# Patient Record
Sex: FEMALE | Race: OTHER | URBAN - METROPOLITAN AREA
[De-identification: names, ages, dates, MRNs, and addresses within clinical notes are randomized per-mention and may not be internally consistent; named-entity substitution may affect disease eponyms.]

---

## 2019-01-22 ENCOUNTER — EMERGENCY (EMERGENCY)
Facility: HOSPITAL | Age: 24
LOS: 0 days | Discharge: HOME | End: 2019-01-22
Attending: EMERGENCY MEDICINE
Payer: MEDICAID

## 2019-01-22 VITALS — HEART RATE: 80 BPM

## 2019-01-22 VITALS
RESPIRATION RATE: 18 BRPM | SYSTOLIC BLOOD PRESSURE: 137 MMHG | HEART RATE: 89 BPM | OXYGEN SATURATION: 99 % | DIASTOLIC BLOOD PRESSURE: 96 MMHG | HEIGHT: 61 IN | WEIGHT: 179.9 LBS | TEMPERATURE: 98 F
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
## 2019-01-22 DIAGNOSIS — S20.20XA CONTUSION OF THORAX, UNSPECIFIED, INITIAL ENCOUNTER: ICD-10-CM
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
## 2019-01-22 PROCEDURE — 71101 X-RAY EXAM UNILAT RIBS/CHEST: CPT | Mod: 26,RT

## 2019-01-22 PROCEDURE — 99284 EMERGENCY DEPT VISIT MOD MDM: CPT

## 2019-01-22 RX ORDER — IBUPROFEN 200 MG
600 TABLET ORAL ONCE
Qty: 0 | Refills: 0 | Status: COMPLETED | OUTPATIENT
Start: 2019-01-22 | End: 2019-01-22

## 2019-01-22 RX ORDER — ACETAMINOPHEN 500 MG
2 TABLET ORAL
Qty: 40 | Refills: 0 | OUTPATIENT
Start: 2019-01-22 | End: 2019-01-26

## 2019-01-22 RX ORDER — IBUPROFEN 200 MG
1 TABLET ORAL
Qty: 20 | Refills: 0 | OUTPATIENT
Start: 2019-01-22 | End: 2019-01-26

## 2019-01-22 RX ORDER — ACETAMINOPHEN 500 MG
975 TABLET ORAL ONCE
Qty: 0 | Refills: 0 | Status: DISCONTINUED | OUTPATIENT
Start: 2019-01-22 | End: 2019-01-22

## 2019-01-22 RX ORDER — OXYCODONE AND ACETAMINOPHEN 5; 325 MG/1; MG/1
1 TABLET ORAL ONCE
Qty: 0 | Refills: 0 | Status: DISCONTINUED | OUTPATIENT
Start: 2019-01-22 | End: 2019-01-22

## 2019-01-22 RX ADMIN — Medication 600 MILLIGRAM(S): at 10:33

## 2019-01-22 RX ADMIN — Medication 600 MILLIGRAM(S): at 11:00

## 2019-01-22 RX ADMIN — OXYCODONE AND ACETAMINOPHEN 1 TABLET(S): 5; 325 TABLET ORAL at 11:00

## 2019-01-22 RX ADMIN — OXYCODONE AND ACETAMINOPHEN 1 TABLET(S): 5; 325 TABLET ORAL at 10:33

## 2019-01-22 NOTE — ED ADULT NURSE NOTE - NSIMPLEMENTINTERV_GEN_ALL_ED
Implemented All Fall with Harm Risk Interventions:  [place] to call system. Call bell, personal items and telephone within reach. Instruct patient to call for assistance. Room bathroom lighting operational. Non-slip footwear when patient is off stretcher. Physically safe environment: no spills, clutter or unnecessary equipment. Stretcher in lowest position, wheels locked, appropriate side rails in place. Provide visual cue, wrist band, yellow gown, etc. Monitor gait and stability. Monitor for mental status changes and reorient to person, place, and time. Review medications for side effects contributing to fall risk. Reinforce activity limits and safety measures with patient and family. Provide visual clues: red socks.

## 2019-01-22 NOTE — ED PROVIDER NOTE - MEDICAL DECISION MAKING DETAILS
23 yo F with R rib contusion; patient states she is sure she is not pregnant, she understands the risk of possible pregnancy after fall, as well as possible contraindication to pain meds when pregnant--pt. verbalizes understanding   -pain control, ibuprofen/percocet, xray ribs on R, r/o fracture  -f/u results, reeval

## 2019-01-22 NOTE — ED ADULT NURSE NOTE - OBJECTIVE STATEMENT
Pt states she fell a half of flight stairs at home, missed a step and hit head, face, right ribs, and back with SOB and nausea. Pt c/o of back and right side rib pain. Pt denies LOC and HA, vomiting. Pt states she fell down 10 staircases at home this morning, missed a step and hit head, face, right ribs, and back with SOB and nausea. Pt c/o of lower back and right side rib pain. Pt presences with right side facial bruising, redness and swelling. Pt denies LOC, HA, and vomiting.

## 2019-01-22 NOTE — ED PROVIDER NOTE - OBJECTIVE STATEMENT
25 yo F s/p accidental fall down about 7-10 steps.  Pt. says she was walking down, tripped and face-planted.  She currently denies LOC.  She remembers falling down.  Her R posterior rib pain is severe, hurts to take a deep breath.  She his her R cheek, but denies change in vision.  She feels well otherwise.   ROS: negative for fever, cough, headache, shortness of breath, abd pain, nausea, vomiting, diarrhea, rash, paresthesia, and weakness--all other systems reviewed are negative.   PMH: negative; Meds: Denies; SH: Denies smoking/drinking/drug use

## 2019-01-22 NOTE — ED PROVIDER NOTE - PHYSICAL EXAMINATION
Vitals: WNL  Gen: AAOx3, NAD, laying uncomfortably in stretcher, tearful   Head: ncat, perrla, eomi b/l  Neck: supple, no lymphadenopathy, no midline deviation  Heart: rrr, no m/r/g  Lungs: CTA b/l, no rales/ronchi/wheezes, tender to palpation over posterior R lateral ribs, no skin changes or gross deformity, no skin break or bruising  Abd: soft, nontender, non-distended, no rebound or guarding  Ext: no clubbing/cyanosis/edema, moving all 4 ext without issue, no gross deformities   Neuro: sensation and muscle strength intact b/l, steady gait   back: no midline tenderness, no superficial evidence of trauma, no stepoff Vitals: WNL  Gen: AAOx3, NAD, laying uncomfortably in stretcher, tearful   Head: + bruising to R maxilla, no crepitus or skin break, no periorbital edema or hematoma, negative finch's/racoons, otherwise ncat, perrla, eomi b/l, no diplopia, no entrapment of EOMs  Neck: supple, no lymphadenopathy, no midline deviation  Heart: rrr, no m/r/g  Lungs: CTA b/l, no rales/ronchi/wheezes, tender to palpation over posterior R lateral ribs, no skin changes or gross deformity, no skin break or bruising  Abd: soft, nontender, non-distended, no rebound or guarding  Ext: no clubbing/cyanosis/edema, moving all 4 ext without issue, no gross deformities   Neuro: sensation and muscle strength intact b/l, steady gait   back: no midline tenderness, no superficial evidence of trauma, no stepoff

## 2019-01-22 NOTE — ED ADULT TRIAGE NOTE - CHIEF COMPLAINT QUOTE
pt states " I fell  down about 10 stairs this morning and hit my head." pt complaining of severe lower back pain. unsure if she passed out.

## 2019-01-22 NOTE — ED PROVIDER NOTE - PROGRESS NOTE DETAILS
Results reported to patient--grossly benign, no Fx on XR  Pt. reports feeling better after meds  pt. agrees to f/u with primary care outpt.  pt. understands to return to ED if symptoms worsen; will d/c
